# Patient Record
Sex: MALE | Race: WHITE | Employment: OTHER | URBAN - METROPOLITAN AREA
[De-identification: names, ages, dates, MRNs, and addresses within clinical notes are randomized per-mention and may not be internally consistent; named-entity substitution may affect disease eponyms.]

---

## 2019-01-01 ENCOUNTER — DOCUMENTATION ONLY (OUTPATIENT)
Dept: FAMILY MEDICINE | Facility: CLINIC | Age: 24
End: 2019-01-01

## 2019-01-01 ENCOUNTER — OFFICE VISIT (OUTPATIENT)
Dept: FAMILY MEDICINE | Facility: CLINIC | Age: 24
End: 2019-01-01
Payer: COMMERCIAL

## 2019-01-01 ENCOUNTER — TELEPHONE (OUTPATIENT)
Dept: FAMILY MEDICINE | Facility: CLINIC | Age: 24
End: 2019-01-01

## 2019-01-01 ENCOUNTER — TELEPHONE (OUTPATIENT)
Dept: FAMILY MEDICINE | Facility: CLINIC | Age: 24
End: 2019-01-01
Payer: COMMERCIAL

## 2019-01-01 VITALS
TEMPERATURE: 97.9 F | HEART RATE: 98 BPM | OXYGEN SATURATION: 93 % | BODY MASS INDEX: 33.23 KG/M2 | DIASTOLIC BLOOD PRESSURE: 85 MMHG | WEIGHT: 231.6 LBS | RESPIRATION RATE: 16 BRPM | SYSTOLIC BLOOD PRESSURE: 132 MMHG

## 2019-01-01 DIAGNOSIS — L30.9 DERMATITIS: Primary | ICD-10-CM

## 2019-01-01 DIAGNOSIS — F19.90 SUBSTANCE USE DISORDER: ICD-10-CM

## 2019-01-01 LAB
HCV RNA SERPL NAA+PROBE-ACNC: NORMAL [IU]/ML
HCV RNA SERPL NAA+PROBE-LOG IU: NORMAL LOG IU/ML

## 2019-01-01 RX ORDER — BENZOCAINE/MENTHOL 6 MG-10 MG
LOZENGE MUCOUS MEMBRANE 2 TIMES DAILY PRN
Qty: 20 G | Refills: 1 | Status: SHIPPED | OUTPATIENT
Start: 2019-01-01

## 2019-04-29 ENCOUNTER — OFFICE VISIT (OUTPATIENT)
Dept: FAMILY MEDICINE | Facility: CLINIC | Age: 24
End: 2019-04-29
Payer: MEDICAID

## 2019-04-29 VITALS
DIASTOLIC BLOOD PRESSURE: 82 MMHG | HEIGHT: 70 IN | OXYGEN SATURATION: 96 % | HEART RATE: 106 BPM | TEMPERATURE: 98.3 F | SYSTOLIC BLOOD PRESSURE: 119 MMHG | WEIGHT: 220 LBS | BODY MASS INDEX: 31.5 KG/M2 | RESPIRATION RATE: 20 BRPM

## 2019-04-29 DIAGNOSIS — F33.42 MAJOR DEPRESSIVE DISORDER, RECURRENT EPISODE, IN FULL REMISSION (H): ICD-10-CM

## 2019-04-29 DIAGNOSIS — B19.20 HEPATITIS C VIRUS INFECTION WITHOUT HEPATIC COMA, UNSPECIFIED CHRONICITY: ICD-10-CM

## 2019-04-29 LAB
ALBUMIN SERPL-MCNC: 4.3 MG/DL (ref 3.8–5)
ALP SERPL-CCNC: 90.5 U/L (ref 31.7–110.7)
ALT SERPL-CCNC: 23.2 U/L (ref 0–45)
AST SERPL-CCNC: 16.4 U/L (ref 0–55)
BILIRUB SERPL-MCNC: 0.4 MG/DL (ref 0.2–1.3)
BUN SERPL-MCNC: 16.2 MG/DL (ref 7–21)
CALCIUM SERPL-MCNC: 9.3 MG/DL (ref 8.5–10.1)
CHLORIDE SERPLBLD-SCNC: 105.8 MMOL/L (ref 98–110)
CO2 SERPL-SCNC: 22.4 MMOL/L (ref 20–32)
CREAT SERPL-MCNC: 0.9 MG/DL (ref 0.7–1.3)
GFR SERPL CREATININE-BSD FRML MDRD: >90 ML/MIN/1.7 M2
GLUCOSE SERPL-MCNC: 119.8 MG'DL (ref 70–99)
POTASSIUM SERPL-SCNC: 4 MMOL/DL (ref 3.3–4.5)
PROT SERPL-MCNC: 7.1 G/DL (ref 6.8–8.8)
SODIUM SERPL-SCNC: 140.8 MMOL/L (ref 132.6–141.4)

## 2019-04-29 RX ORDER — LANOLIN ALCOHOL/MO/W.PET/CERES
3 CREAM (GRAM) TOPICAL
COMMUNITY

## 2019-04-29 RX ORDER — QUETIAPINE FUMARATE 200 MG/1
200 TABLET, FILM COATED ORAL 2 TIMES DAILY
COMMUNITY
End: 2020-01-01

## 2019-04-29 RX ORDER — HYDROCORTISONE 25 MG/G
OINTMENT TOPICAL 2 TIMES DAILY
COMMUNITY
End: 2019-01-01

## 2019-04-29 RX ORDER — VENLAFAXINE HYDROCHLORIDE 150 MG/1
150 CAPSULE, EXTENDED RELEASE ORAL DAILY
COMMUNITY
End: 2020-01-01

## 2019-04-29 RX ORDER — SIMETHICONE 80 MG
80 TABLET,CHEWABLE ORAL EVERY 6 HOURS PRN
COMMUNITY

## 2019-04-29 ASSESSMENT — MIFFLIN-ST. JEOR: SCORE: 1981.22

## 2019-04-29 NOTE — PROGRESS NOTES
Male Physical Note          HPI         Concerns today: positive hep C screen [facility].     Patient Active Problem List   Diagnosis     Major depressive disorder, recurrent episode, in full remission (H)     Hepatitis C       History reviewed. No pertinent past medical history.    Previous Medical Care   [facility]  [facility]      Family History   Problem Relation Age of Onset     No Known Problems Mother      No Known Problems Father      No Known Problems Sister      No Known Problems Brother               Review of Systems:     Review of Systems:  CONSTITUTIONAL: NEGATIVE for fever, chills, change in weight  INTEGUMENTARY/SKIN: NEGATIVE for worrisome rashes, moles or lesions  EYES: NEGATIVE for vision changes or irritation  ENT/MOUTH: NEGATIVE for ear, mouth and throat problems  RESP: NEGATIVE for significant cough or SOB  BREAST: NEGATIVE for masses, tenderness or discharge  CV: NEGATIVE for chest pain, palpitations or peripheral edema  GI: NEGATIVE for nausea, abdominal pain, heartburn, or change in bowel habits  : NEGATIVE for frequency, dysuria, or hematuria  MUSCULOSKELETAL: NEGATIVE for significant arthralgias or myalgia  NEURO: NEGATIVE for weakness, dizziness or paresthesias  ENDOCRINE: NEGATIVE for temperature intolerance, skin/hair changes  HEME/ALLERGY: NEGATIVE for bleeding problems  PSYCHIATRIC: NEGATIVE for changes in mood or affect  Sleep:   Do you snore most or the night (as reported by a family member)? No  Do you feel sleepy or extremely tired during most of the day? No             Social History     Social History     Socioeconomic History     Marital status: Single     Spouse name: Not on file     Number of children: Not on file     Years of education: Not on file     Highest education level: Not on file   Occupational History     Not on file   Social Needs     Financial resource strain: Not on file     Food insecurity:     Worry: Not on file     Inability: Not on file      "Transportation needs:     Medical: Not on file     Non-medical: Not on file   Tobacco Use     Smoking status: Current Every Day Smoker     Smokeless tobacco: Never Used   Substance and Sexual Activity     Alcohol use: Not Currently     Drug use: Not Currently     Sexual activity: Not Currently   Lifestyle     Physical activity:     Days per week: Not on file     Minutes per session: Not on file     Stress: Not on file   Relationships     Social connections:     Talks on phone: Not on file     Gets together: Not on file     Attends Christianity service: Not on file     Active member of club or organization: Not on file     Attends meetings of clubs or organizations: Not on file     Relationship status: Not on file     Intimate partner violence:     Fear of current or ex partner: Not on file     Emotionally abused: Not on file     Physically abused: Not on file     Forced sexual activity: Not on file   Other Topics Concern     Not on file   Social History Narrative     Not on file       Marital Status:Single  Who lives in your household? Housemates, intermediate house.     Has anyone hurt you physically, for example by pushing, hitting, slapping or kicking you or forcing you to have sex? Denies  Do you feel threatened or controlled by a partner, ex-partner or anyone in your life? Denies    Sexual Health     Sexual concerns: No   STI History: Neg      Recommended Screening   None          Physical Exam:     Vitals: /82   Pulse 106   Temp 98.3  F (36.8  C) (Oral)   Resp 20   Ht 1.765 m (5' 9.5\")   Wt 99.8 kg (220 lb)   SpO2 96%   BMI 32.02 kg/m    BMI= Body mass index is 32.02 kg/m .  GENERAL: healthy, alert and no distress  EYES: Eyes grossly normal to inspection, extraocular movements - intact, and PERRL  HENT: ear canals- normal; TMs- normal; Nose- normal; Mouth- no ulcers, no lesions  NECK: no tenderness, no adenopathy, no asymmetry, no masses, no stiffness; thyroid- normal to palpation  RESP: lungs clear to " auscultation - no rales, no rhonchi, no wheezes  BREAST: no masses, no tenderness, no nipple discharge, no palpable axillary masses or adenopathy  CV: regular rates and rhythm, normal S1 S2, no S3 or S4 and no murmur, no click or rub -  ABDOMEN: soft, no tenderness, no  hepatosplenomegaly, no masses, normal bowel sounds  MS: extremities- no gross deformities noted, no edema  SKIN: no suspicious lesions, no rashes  NEURO: strength and tone- normal, sensory exam- grossly normal, mentation- intact, speech- normal, reflexes- symmetric  BACK: no CVA tenderness, no paralumbar tenderness  PSYCH: Alert and oriented times 3; speech- coherent , normal rate and volume; able to articulate logical thoughts, able to abstract reason, no tangential thoughts, no hallucinations or delusions, affect- normal  LYMPHATICS: ant. cervical- normal, post. cervical- normal, axillary- normal, supraclavicular- normal, inguinal- normal    Assessment and Plan      Kamron was seen today for physical.    Diagnoses and all orders for this visit:    Major depressive disorder, recurrent episode, in full remission (H)    Hepatitis C virus infection without hepatic coma, unspecified chronicity      1. Health Care Maintenance: Normal Physical Exam    PLAN:  1. Check hep C screen w/ reflex to quant and genotype   2. Referal to hepatology if positive hep C   3. CMP   4. Discontinue gas-x     Options for treatment and follow-up care were reviewed with the patient. Kamron Duarte and/or guardian engaged in the decision making process and verbalized understanding of the options discussed and agreed with the final plan.    Michael Johnson MD

## 2019-04-29 NOTE — PROGRESS NOTES
Preceptor Attestation:   Patient seen, evaluated and discussed with the resident. I have verified the content of the note, which accurately reflects my assessment of the patient and the plan of care.   Supervising Physician:  Kyra Garcia MD

## 2019-04-29 NOTE — PATIENT INSTRUCTIONS
Here is the plan from today's visit    1. Major depressive disorder, recurrent episode, in full remission (H)    2. Hepatitis C virus infection without hepatic coma, unspecified chronicity  We will call with results.   - Hepatitis C Screen Reflex to HCV RNA Quant and Genotype  - Comprehensive Metabolic Panel ([facility])    Please call or return to clinic if your symptoms don't go away.    Follow up plan  Please make a clinic appointment for follow up with me (JASON HERNANDEZ) in 1  Year or sooner if needed.      Thank you for coming to [facility] today.  Lab Testing:  **If you had lab testing today and your results are reassuring or normal they will be mailed to you or sent through [employer] within 7 days.   **If the lab tests need quick action we will call you with the results.  The phone number we will call with results is # [phone] (home) . If this is not the best number please call our clinic and change the number.  Medication Refills:  If you need any refills please call your pharmacy and they will contact us.   If you need to  your refill at a new pharmacy, please contact the new pharmacy directly. The new pharmacy will help you get your medications transferred faster.   Scheduling:  If you have any concerns about today's visit or wish to schedule another appointment please call our office during normal business hours [phone] (8-5:00 M-F)  If a referral was made to a [facility] and you don't get a call from central scheduling please call [phone].  If a Mammogram was ordered for you at [facility] call [phone] to schedule or change your appointment.  If you had an XRay/CT/Ultrasound/MRI ordered the number is [phone] to schedule or change your radiology appointment.   Medical Concerns:  If you have urgent medical concerns please call [phone] at any time of the day.    Jason Hernandez MD      Preventive Health Recommendations  Male Ages 21 -  25     Yearly exam:             See your health care provider every year in order to  o   Review health changes.   o   Discuss preventive care.    o   Review your medicines if your doctor has prescribed any.    You should be tested each year for STDs (sexually transmitted diseases).     Talk to your provider about cholesterol testing.      If you are at risk for diabetes, you should have a diabetes test (fasting glucose).    Shots: Get a flu shot each year. Get a tetanus shot every 10 years.     Nutrition:    Eat at least 5 servings of fruits and vegetables daily.     Eat whole-grain bread, whole-wheat pasta and brown rice instead of white grains and rice.     Get adequate calcium and Vitamin D.     Lifestyle    Exercise for at least 150 minutes a week (30 minutes a day, 5 days a week). This will help you control your weight and prevent disease.     Limit alcohol to one drink per day.     No smoking.     Wear sunscreen to prevent skin cancer.     See your dentist every six months for an exam and cleaning.

## 2019-05-02 LAB
HCV AB SERPL QL IA: REACTIVE
HCV RNA SERPL NAA+PROBE-ACNC: NORMAL [IU]/ML
HCV RNA SERPL NAA+PROBE-LOG IU: NORMAL LOG IU/ML

## 2019-05-06 ENCOUNTER — TELEPHONE (OUTPATIENT)
Dept: FAMILY MEDICINE | Facility: CLINIC | Age: 24
End: 2019-05-06

## 2019-05-06 DIAGNOSIS — B19.20 HEPATITIS C VIRUS INFECTION WITHOUT HEPATIC COMA, UNSPECIFIED CHRONICITY: Primary | ICD-10-CM

## 2019-05-06 NOTE — TELEPHONE ENCOUNTER
"Per PCP, \"Please call the patent with the following message: Your hepatitis C screen was positive but there was no sign of active disease. You should still go see GI to discuss these results but likely will not need any treatment. The referral has been placed for you. \"    RN attempted to reach patient/ group home, but was unable to reach. Left voicemail with name and callback number.  Radha Choudhury RN    "

## 2019-05-07 ENCOUNTER — DOCUMENTATION ONLY (OUTPATIENT)
Dept: CARE COORDINATION | Facility: CLINIC | Age: 24
End: 2019-05-07

## 2019-05-07 ENCOUNTER — TELEPHONE (OUTPATIENT)
Dept: FAMILY MEDICINE | Facility: CLINIC | Age: 24
End: 2019-05-07

## 2019-05-07 NOTE — TELEPHONE ENCOUNTER
"Called patient to relay results message per PCP, informed patient Hepatitis C screening was to positive, but \"no active disease\" and to schedule GI appointment. Telephone number to schedule appointment given.     Andreia Jauregui RN    "

## 2019-05-07 NOTE — TELEPHONE ENCOUNTER
"Received return phone call from \"housing \" lived by the patient.     RN informed Program , the need to have SOLO, prior to discussing health information.    Andreia Jauregui RN    "

## 2019-05-08 NOTE — PROGRESS NOTES
GI Referral   Hi, pt is scheduled 5/16 in hepatology      Nael Felton [facility]  Care Coordinator

## 2019-05-16 ENCOUNTER — OFFICE VISIT (OUTPATIENT)
Dept: GASTROENTEROLOGY | Facility: CLINIC | Age: 24
End: 2019-05-16
Attending: INTERNAL MEDICINE
Payer: MEDICAID

## 2019-05-16 VITALS
HEIGHT: 69 IN | DIASTOLIC BLOOD PRESSURE: 81 MMHG | BODY MASS INDEX: 32.88 KG/M2 | SYSTOLIC BLOOD PRESSURE: 126 MMHG | WEIGHT: 222 LBS | TEMPERATURE: 98.2 F | HEART RATE: 112 BPM

## 2019-05-16 DIAGNOSIS — R76.8 HEPATITIS C ANTIBODY POSITIVE IN BLOOD: ICD-10-CM

## 2019-05-16 DIAGNOSIS — R76.8 HEPATITIS C ANTIBODY POSITIVE IN BLOOD: Primary | ICD-10-CM

## 2019-05-16 LAB
HAV IGG SER QL IA: REACTIVE
HBV CORE AB SERPL QL IA: NONREACTIVE
HBV SURFACE AB SERPL IA-ACNC: >1000 M[IU]/ML
HBV SURFACE AG SERPL QL IA: NONREACTIVE
HCV AB SERPL QL IA: REACTIVE

## 2019-05-16 PROCEDURE — 87522 HEPATITIS C REVRS TRNSCRPJ: CPT | Performed by: INTERNAL MEDICINE

## 2019-05-16 PROCEDURE — G0499 HEPB SCREEN HIGH RISK INDIV: HCPCS | Performed by: INTERNAL MEDICINE

## 2019-05-16 PROCEDURE — 36415 COLL VENOUS BLD VENIPUNCTURE: CPT | Performed by: INTERNAL MEDICINE

## 2019-05-16 PROCEDURE — 86803 HEPATITIS C AB TEST: CPT | Performed by: INTERNAL MEDICINE

## 2019-05-16 PROCEDURE — 86706 HEP B SURFACE ANTIBODY: CPT | Performed by: INTERNAL MEDICINE

## 2019-05-16 PROCEDURE — 86708 HEPATITIS A ANTIBODY: CPT | Performed by: INTERNAL MEDICINE

## 2019-05-16 PROCEDURE — 86704 HEP B CORE ANTIBODY TOTAL: CPT | Performed by: INTERNAL MEDICINE

## 2019-05-16 PROCEDURE — G0463 HOSPITAL OUTPT CLINIC VISIT: HCPCS | Mod: ZF

## 2019-05-16 RX ORDER — HYDROXYZINE HYDROCHLORIDE 50 MG/1
TABLET, FILM COATED ORAL
COMMUNITY
Start: 2019-05-09 | End: 2020-01-01

## 2019-05-16 ASSESSMENT — PAIN SCALES - GENERAL: PAINLEVEL: NO PAIN (0)

## 2019-05-16 ASSESSMENT — MIFFLIN-ST. JEOR: SCORE: 1982.37

## 2019-05-16 NOTE — NURSING NOTE
"/81   Pulse 112   Temp 98.2  F (36.8  C) (Oral)   Ht 1.753 m (5' 9\")   Wt 100.7 kg (222 lb)   BMI 32.78 kg/m    Chief Complaint   Patient presents with     Consult     consult with Jethro ASHLEY, oumou milan       "

## 2019-05-16 NOTE — LETTER
5/16/2019       RE: Kamron Duarte  [address] [address]     Dear Colleague,    Thank you for referring your patient, Kamron Duarte, to the [facility] HEPATOLOG[facility] at [facility]. Please see a copy of my visit note below.    Date of Service: 5/16/2019     Referring Provider: Michael Johnson    Subjective:            Kamron Duarte is a 23 year old male presenting for evaluation of abnormal hepatitis tests    History of Present Illness   Kamron Duarte is a 23 year old male with past medical history of IV narcotic use who presents in consultation for recent screening for infections and was found to have hepatitis C antibody positive.    The patient admits that he had used IV narcotics for many years, and has been sober since August 2018.  He has not required or is currently using any transition or bridging oral therapies.  He denies significant history of alcohol use and denies alcohol misuse.  He notes very rare inhaled drugs of abuse.  He denies use of marijuana or any cannabinoids substances at this time.  He reports that [facility] infectious diseases and reports that he was negative for HIV but did test positive for hepatitis C.  He reports that he used IV drugs after this clinic visit up until August and has been sober since this time.  Has established care with a primary care provider and underwent lab tests on April 29, 2019.  These demonstrated: AST 16, ALT 23, alkaline phosphatase 90, total bilirubin 0.4, hepatitis C antibody positive, hepatitis C RNA not detected.  Based on the studies the patient was referred for further assessment.    He admits to historically being sexually active with women, denies any overt high risk sexual encounters.  He does admit to sharing needles with other IV drug users in the past.  He does have 2 tattoos, neither of which was performed professionally.  He notes  "that a maternal grandfather had an unspecified hepatitis and liver disease which ultimately led to his death.  There is no overt family history of liver cancer.    Past Medical History:  -Major depressive disorder  -History of IV narcotic misuse    Surgical History:  Past Surgical History:   Procedure Laterality Date     TONSILLECTOMY         Social History:  Social History     Tobacco Use     Smoking status: Current Every Day Smoker     Smokeless tobacco: Never Used   Substance Use Topics     Alcohol use: Not Currently     Drug use: Not Currently        Family History:  -Maternal grandmother: Breast cancer  -Maternal grandfather: Hepatitis    Medications:  Current Outpatient Medications   Medication     hydrocortisone 2.5 % ointment     hydrOXYzine (ATARAX) 50 MG tablet     QUEtiapine (SEROQUEL) 200 MG tablet     venlafaxine (EFFEXOR-XR) 150 MG 24 hr capsule     melatonin 3 MG tablet     simethicone (MYLICON) 80 MG chewable tablet     No current facility-administered medications for this visit.        Review of Systems  A complete 10 point review of systems was asked and answered in the negative unless specifically commented upon in the HPI    Objective:         Vitals:    05/16/19 1047   BP: 126/81   Pulse: 112   Temp: 98.2  F (36.8  C)   TempSrc: Oral   Weight: 100.7 kg (222 lb)   Height: 1.753 m (5' 9\")     Body mass index is 32.78 kg/m .     Physical Exam    Vitals reviewed.   Constitutional: Well-developed, well-nourished, in no apparent distress.    HEENT: Normocephalic. no scleral icterus. Moist oral mucosa. Dentition normal  Neck/Lymph: Normal ROM, supple. No thyromegaly.  No lymphadenopathy  Cardiac:  Regular rate and rhythm.  No overt murmurs  Respiratory: Clear to auscultation bilaterally.  No wheezes or rales  GI:  Abdomen soft, non-distended, non-tender. BS present. no shifting dullness. No overt hepatosplenomegaly.     Skin:  Skin is warm and dry. No rash noted.  no jaundice. no spider nevi noted.  no " palmar erythema  Peripheral Vascular: no lower extremity edema. 2+ pulses in all extremities  Musculoskeletal:  ROM intact, normal muscle bulk    Psychiatric: Normal mood and affect. Behavior is normal.  Neuro:  no asterixis, no tremor    Labs and Diagnostic tests:  Lab Results   Component Value Date    BILITOTAL 0.4 04/29/2019    ALT 23.2 04/29/2019    AST 16.4 04/29/2019    ALKPHOS 90.5 04/29/2019     Lab Results   Component Value Date    PROTTOTAL 7.1 04/29/2019      Results for SEN LEIGH (MRN [id number]) as of 5/16/2019 12:24   Ref. Range 4/29/2019 15:06   Hepatitis C Antibody Latest Ref Range: NR^Nonreactive  Reactive (AA)   HCV RNA Quant IU/ml Latest Ref Range: HCVND^HCV RNA Not Detected [IU]/mL HCV RNA Not Detected   Log of HCV RNA Qt Latest Ref Range: <1.2 Log IU/mL Not Calculated       Assessment and Plan:    Abnormal Hepatitis Serologies:    -Based on the available data, the patient has a history of hepatitis C antibody positive and hepatitis C RNA negative serologies, as recently as April 29, 2019  -If these values are true, the patient has developed a spontaneous clearance of a previous infection from chronic hepatitis C and has developed cure and immunity  -Because of his previous high risk, I do feel it is prudent to confirm these results with a repeat hepatitis C antibody and RNA quantification  -Also felt prudent to today to evaluate for immunity or exposure to hepatitis A and B  -As he had recent normal liver tests, I do not believe necessary to otherwise repeat these  -If his labs returned without evidence of active infection he will be discharged from our clinic without risk of long-term sequela    Follow Up:  As needed     Thank you very much for the opportunity to participate in the care of this patient.  If you have any further questions, please don't hesitate to contact our office.    Thomas M. Leventhal, M.D.   of Medicine  Advanced & Transplant Hepatology  The  [facility]    I spent a total time (reviewing notes, labs, imaging, clinical status events) of 30 minutes with the patient, of which > 50% was spent face-to-face with the patient in education, care coordination and counseling regarding the patient's liver tests (explaining treatment options, disease processes, laboratory/imaging results, prognosis, risks and benefits of treatment options, medication side effects, importance of compliance with treatment, risk factor reduction, follow up with primary care physician).        Again, thank you for allowing me to participate in the care of your patient.      Sincerely,    Thomas M. Leventhal, MD

## 2019-05-16 NOTE — PROGRESS NOTES
Date of Service: 5/16/2019     Referring Provider: Michael Johnson    Subjective:            Kamron Duarte is a 23 year old male presenting for evaluation of abnormal hepatitis tests    History of Present Illness   Kamron Duarte is a 23 year old male with past medical history of IV narcotic use who presents in consultation for recent screening for infections and was found to have hepatitis C antibody positive.    The patient admits that he had used IV narcotics for many years, and has been sober since August 2018.  He has not required or is currently using any transition or bridging oral therapies.  He denies significant history of alcohol use and denies alcohol misuse.  He notes very rare inhaled drugs of abuse.  He denies use of marijuana or any cannabinoids substances at this time.  He reports that [facility] diseases and reports that he was negative for HIV but did test positive for hepatitis C.  He reports that he used IV drugs after this clinic visit up until August and has been sober since this time.  Has established care with a primary care provider and underwent lab tests on April 29, 2019.  These demonstrated: AST 16, ALT 23, alkaline phosphatase 90, total bilirubin 0.4, hepatitis C antibody positive, hepatitis C RNA not detected.  Based on the studies the patient was referred for further assessment.    He admits to historically being sexually active with women, denies any overt high risk sexual encounters.  He does admit to sharing needles with other IV drug users in the past.  He does have 2 tattoos, neither of which was performed professionally.  He notes that a maternal grandfather had an unspecified hepatitis and liver disease which ultimately led to his death.  There is no overt family history of liver cancer.    Past Medical History:  -Major depressive disorder  -History of IV narcotic misuse    Surgical History:  Past  "Surgical History:   Procedure Laterality Date     TONSILLECTOMY         Social History:  Social History     Tobacco Use     Smoking status: Current Every Day Smoker     Smokeless tobacco: Never Used   Substance Use Topics     Alcohol use: Not Currently     Drug use: Not Currently        Family History:  -Maternal grandmother: Breast cancer  -Maternal grandfather: Hepatitis    Medications:  Current Outpatient Medications   Medication     hydrocortisone 2.5 % ointment     hydrOXYzine (ATARAX) 50 MG tablet     QUEtiapine (SEROQUEL) 200 MG tablet     venlafaxine (EFFEXOR-XR) 150 MG 24 hr capsule     melatonin 3 MG tablet     simethicone (MYLICON) 80 MG chewable tablet     No current facility-administered medications for this visit.        Review of Systems  A complete 10 point review of systems was asked and answered in the negative unless specifically commented upon in the HPI    Objective:         Vitals:    05/16/19 1047   BP: 126/81   Pulse: 112   Temp: 98.2  F (36.8  C)   TempSrc: Oral   Weight: 100.7 kg (222 lb)   Height: 1.753 m (5' 9\")     Body mass index is 32.78 kg/m .     Physical Exam    Vitals reviewed.   Constitutional: Well-developed, well-nourished, in no apparent distress.    HEENT: Normocephalic. no scleral icterus. Moist oral mucosa. Dentition normal  Neck/Lymph: Normal ROM, supple. No thyromegaly.  No lymphadenopathy  Cardiac:  Regular rate and rhythm.  No overt murmurs  Respiratory: Clear to auscultation bilaterally.  No wheezes or rales  GI:  Abdomen soft, non-distended, non-tender. BS present. no shifting dullness. No overt hepatosplenomegaly.     Skin:  Skin is warm and dry. No rash noted.  no jaundice. no spider nevi noted.  no palmar erythema  Peripheral Vascular: no lower extremity edema. 2+ pulses in all extremities  Musculoskeletal:  ROM intact, normal muscle bulk    Psychiatric: Normal mood and affect. Behavior is normal.  Neuro:  no asterixis, no tremor    Labs and Diagnostic tests:  Lab " Results   Component Value Date    BILITOTAL 0.4 04/29/2019    ALT 23.2 04/29/2019    AST 16.4 04/29/2019    ALKPHOS 90.5 04/29/2019     Lab Results   Component Value Date    PROTTOTAL 7.1 04/29/2019      Results for SEN LEIGH (MRN [id number]) as of 5/16/2019 12:24   Ref. Range 4/29/2019 15:06   Hepatitis C Antibody Latest Ref Range: NR^Nonreactive  Reactive (AA)   HCV RNA Quant IU/ml Latest Ref Range: HCVND^HCV RNA Not Detected [IU]/mL HCV RNA Not Detected   Log of HCV RNA Qt Latest Ref Range: <1.2 Log IU/mL Not Calculated       Assessment and Plan:    Abnormal Hepatitis Serologies:    -Based on the available data, the patient has a history of hepatitis C antibody positive and hepatitis C RNA negative serologies, as recently as April 29, 2019  -If these values are true, the patient has developed a spontaneous clearance of a previous infection from chronic hepatitis C and has developed cure and immunity  -Because of his previous high risk, I do feel it is prudent to confirm these results with a repeat hepatitis C antibody and RNA quantification  -Also felt prudent to today to evaluate for immunity or exposure to hepatitis A and B  -As he had recent normal liver tests, I do not believe necessary to otherwise repeat these  -If his labs returned without evidence of active infection he will be discharged from our clinic without risk of long-term sequela    Follow Up:  As needed     Thank you very much for the opportunity to participate in the care of this patient.  If you have any further questions, please don't hesitate to contact our office.    Thomas M. Leventhal, M.D.   of Medicine  Advanced & Transplant Hepatology  [facility]    I spent a total time (reviewing notes, labs, imaging, clinical status events) of 30 minutes with the patient, of which > 50% was spent face-to-face with the patient in education, care coordination and counseling regarding the patient's  liver tests (explaining treatment options, disease processes, laboratory/imaging results, prognosis, risks and benefits of treatment options, medication side effects, importance of compliance with treatment, risk factor reduction, follow up with primary care physician).

## 2019-05-17 ENCOUNTER — OFFICE VISIT (OUTPATIENT)
Dept: FAMILY MEDICINE | Facility: CLINIC | Age: 24
End: 2019-05-17
Payer: MEDICAID

## 2019-05-17 VITALS
TEMPERATURE: 98.2 F | SYSTOLIC BLOOD PRESSURE: 122 MMHG | HEART RATE: 74 BPM | RESPIRATION RATE: 16 BRPM | OXYGEN SATURATION: 97 % | DIASTOLIC BLOOD PRESSURE: 84 MMHG | WEIGHT: 224 LBS | BODY MASS INDEX: 32.07 KG/M2 | HEIGHT: 70 IN

## 2019-05-17 DIAGNOSIS — R11.2 NON-INTRACTABLE VOMITING WITH NAUSEA, UNSPECIFIED VOMITING TYPE: Primary | ICD-10-CM

## 2019-05-17 ASSESSMENT — MIFFLIN-ST. JEOR: SCORE: 2007.31

## 2019-05-24 NOTE — PROGRESS NOTES
ASSESSMENT/PLAN:         ICD-10-CM    1. Non-intractable vomiting with nausea, unspecified vomiting type R11.2              SUBJECTIVE:   Kamron Duarte is a 23 year old male who presents to clinic today for the following health issues:  1.  Episode of vomiting--patient states that he had an episode of vomiting.  Says he may have eaten something different.  The episode happened a couple of days ago and then has had not episodes since.  Had nausea with it but it has since resolved.  No diarrhea.  No significant abdominal pain.  No fevers.  Has a history of depression and hep c but has not noticed any skin color changes and, again, resolved after just one day of feeling sick.      Problem list and histories reviewed & adjusted, as indicated.  Additional history: as documented    Patient Active Problem List   Diagnosis     Major depressive disorder, recurrent episode, in full remission (H)     Hepatitis C     Past Surgical History:   Procedure Laterality Date     TONSILLECTOMY         Social History     Tobacco Use     Smoking status: Current Every Day Smoker     Smokeless tobacco: Never Used   Substance Use Topics     Alcohol use: Not Currently     Family History   Problem Relation Age of Onset     No Known Problems Mother      No Known Problems Father      No Known Problems Sister      No Known Problems Brother          Current Outpatient Medications   Medication Sig Dispense Refill     hydrocortisone 2.5 % ointment Apply topically 2 times daily       hydrOXYzine (ATARAX) 50 MG tablet        QUEtiapine (SEROQUEL) 200 MG tablet Take 200 mg by mouth 2 times daily       venlafaxine (EFFEXOR-XR) 150 MG 24 hr capsule Take 150 mg by mouth daily       melatonin 3 MG tablet Take 3 mg by mouth nightly as needed for sleep       simethicone (MYLICON) 80 MG chewable tablet Take 80 mg by mouth every 6 hours as needed for flatulence or cramping       Allergies   Allergen Reactions     Mushrooms [Mushroom]      Sulfa Drugs   "      Reviewed and updated as needed this visit by clinical staff  Tobacco  Allergies  Meds  Med Hx  Surg Hx  Fam Hx  Soc Hx      Reviewed and updated as needed this visit by Provider         ROS:  Constitutional, HEENT, cardiovascular, pulmonary, gi and gu systems are negative, except as otherwise noted.    OBJECTIVE:     /84   Pulse 74   Temp 98.2  F (36.8  C) (Oral)   Resp 16   Ht 1.778 m (5' 10\")   Wt 101.6 kg (224 lb)   SpO2 97%   BMI 32.14 kg/m    Body mass index is 32.14 kg/m .  GENERAL: healthy, alert and no distress  RESP: lungs clear to auscultation - no rales, rhonchi or wheezes  CV: regular rate and rhythm, normal S1 S2, no S3 or S4, no murmur, click or rub, no peripheral edema and peripheral pulses strong  ABDOMEN: soft, nontender, no hepatosplenomegaly, no masses and bowel sounds normal  MS: no gross musculoskeletal defects noted, no edema    Diagnostic Test Results:  none         Ar Garcia MD  [facility]    "

## 2019-05-30 NOTE — TELEPHONE ENCOUNTER
Kota, manager at [facility], calling to request that hydrocortisone ointment be changed from being a scheduled medication to a PRN medication.

## 2019-05-30 NOTE — TELEPHONE ENCOUNTER
Called patient/ to notify Pt needs to be seen to address medication needs and instruction of how to use. Telephone call transferred to  call center to assist patient to schedule an appointment with PCP.    Andreia Jauregui RN

## 2019-05-30 NOTE — TELEPHONE ENCOUNTER
[facility] phone call message- medication clarification/question:    Full Medication Name: hydrocortisone 2.5 % ointment   Dose: Apply topically 2 times daily - Topical    Question/Clarification needed: Patient requesting to have medication changed from being a scheduled medication to a PRN medication. Please call patient once update script is sent to pharmacy. Patient was scheduled to be seen today to discuss medication change but arrived late and declined to reschedule. Please advise.     Pharmacy confirmed as   [facility] - [place], [place] - [address]  [address]  [address]  [facility] [address]  Phone: [phone] Fax: [phone]  : Yes    Please leave ONLY preferred pharmacy    OK to leave a message on voice mail? Yes    Advised patient that RN would call back within 3 hours, unless emergent.    Primary language: English      needed? No    Call taken on May 30, 2019 at 10:33 AM by Halima Mckeon to [facility]

## 2019-06-04 PROBLEM — F19.90 SUBSTANCE USE DISORDER: Status: ACTIVE | Noted: 2019-01-01

## 2019-06-04 NOTE — PROGRESS NOTES
Preceptor Attestation:   Patient seen, evaluated and discussed with the resident. I have verified the content of the note, which accurately reflects my assessment of the patient and the plan of care.   Supervising Physician:  Angela Garcia MD

## 2019-06-04 NOTE — PATIENT INSTRUCTIONS
Here is the plan from today's visit    1. Dermatitis  - hydrocortisone (CORTAID) 1 % external cream; Apply topically 2 times daily as needed for rash or itching  Dispense: 20 g; Refill: 1    Please call or return to clinic if your symptoms don't go away.    Follow up plan  As needed, in next week if desiring suboxone through our clinic.     Thank you for coming to [facility] today.  Lab Testing:  **If you had lab testing today and your results are reassuring or normal they will be mailed to you or sent through [facility] within 7 days.   **If the lab tests need quick action we will call you with the results.  The phone number we will call with results is # [phone] (home) . If this is not the best number please call our clinic and change the number.  Medication Refills:  If you need any refills please call your pharmacy and they will contact us.   If you need to  your refill at a new pharmacy, please contact the new pharmacy directly. The new pharmacy will help you get your medications transferred faster.   Scheduling:  If you have any concerns about today's visit or wish to schedule another appointment please call our office during normal business hours [phone] (8-5:00 M-F)  If a referral was made to a [facility] and you don't get a call from central scheduling please call [phone].  If a Mammogram was ordered for you at [facility] call [phone] to schedule or change your appointment.  If you had an XRay/CT/Ultrasound/MRI ordered the number is [phone] to schedule or change your radiology appointment.   Medical Concerns:  If you have urgent medical concerns please call [phone] at any time of the day.    Michael Johnson MD

## 2019-06-04 NOTE — PROGRESS NOTES
HPI       Kamron Duarte is a 23 year old  who presents for     Chief Complaint   Patient presents with     Recheck Medication     hydrocortisone      Kamron Duarte is a 23 year old male who presents for medication adjustment of his hydrocortisone from scheduled to PRN. It was prescribed for a rash and pruritis on his head and scalp while hospitalized at [place]. His rash and pruritis are now resolved. Unclear diagnosis.     He has a history of major depressive disorder, resolved hepatitis C, and IV drug use. He was recently admitted to an outside ED for a heroin overdose, and during his visit he experienced a temporary raise in creatine and elevated troponin secondary to hypotension and hypoxia. He left [place] with a suboxone prescription and follow up at a addiction clinic on 6/10/2019. The patient returned to his group home. Currently denies any withdrawal symptoms while on suboxone and is only complaining of some fatigue.     For his mental health he continues to follow with psychiatry.         +++++++      Problem, Medication and Allergy Lists were      Patient Active Problem List    Diagnosis Date Noted     Major depressive disorder, recurrent episode, in full remission (H) 04/29/2019     Priority: Medium     Hepatitis C 04/29/2019     Priority: Medium         Current Outpatient Medications   Medication Sig Dispense Refill     hydrocortisone (CORTAID) 1 % external cream Apply topically 2 times daily as needed for rash or itching 20 g 1     hydrOXYzine (ATARAX) 50 MG tablet        QUEtiapine (SEROQUEL) 200 MG tablet Take 200 mg by mouth 2 times daily       venlafaxine (EFFEXOR-XR) 150 MG 24 hr capsule Take 150 mg by mouth daily       melatonin 3 MG tablet Take 3 mg by mouth nightly as needed for sleep       simethicone (MYLICON) 80 MG chewable tablet Take 80 mg by mouth every 6 hours as needed for flatulence or cramping           Allergies   Allergen Reactions     Mushrooms [Mushroom]      Sulfa Drugs   "  .    Patient is an established patient of this clinic..         Review of Systems:   Review of Systems  A greater than 4 point review of symptoms was completed and negative other than noted in the HPI.        Physical Exam:     Vitals:    06/04/19 1428   BP: 132/85   Pulse: 98   Resp: 16   Temp: 97.9  F (36.6  C)   TempSrc: Oral   SpO2: 93%   Weight: 105.1 kg (231 lb 9.6 oz)     Body mass index is 33.23 kg/m .  Vitals were reviewed and were normal    Physical Exam   Constitutional: He appears well-developed and well-nourished. No distress.   Cardiovascular: Normal rate, regular rhythm and normal heart sounds.   Pulmonary/Chest: Effort normal and breath sounds normal. No stridor. No respiratory distress. He has no wheezes. He has no rales.   Neurological: He is alert.   Skin: Skin is warm and dry. He is not diaphoretic.   Some small erythematous lesions on right post auricular area.    Psychiatric:   Appears depressed            Results:   No testing ordered today    Assessment and Plan      Dermatitis/Hydrocortisone Adjustment   Provided new prescription for the 1% hydrocortisone PRN. Completed paperwork for group home regarding hydrocortisone use.     Opoid Addiction/Suboxone   Counseled patient on the option of continuing his opioid addiction and suboxone treatment here with his primary care through our suboxone clinic. Patient's group  said that this would be shared with their management and discussed. They will contact if desired. Patient plans to continue on suboxone \"for a while\". Would also do repeat screening for HIV, hepatitis on next follow up.       Kamron was seen today for recheck medication.    Diagnoses and all orders for this visit:    Dermatitis  -     hydrocortisone (CORTAID) 1 % external cream; Apply topically 2 times daily as needed for rash or itching           Medications Discontinued During This Encounter   Medication Reason     hydrocortisone 2.5 % ointment        Options for " treatment and follow-up care were reviewed with the patient. Kamrno Duarte  engaged in the decision making process and verbalized understanding of the options discussed and agreed with the final plan.    Michael Johnson MD

## 2019-06-05 NOTE — TELEPHONE ENCOUNTER
[facility] phone call message- general phone call:    Reason for call: Kota,  with [facility], calling to speak with Care Coordinator in charge of Suboxone Program. Per Kota, facility would like to start patient on Suboxone but have a few questions and concerns they would like to discuss.    Action Desired: Call back     Return call needed: Yes    OK to leave a message on voice mail? Yes    Advised patient response may take up to 2 business days: Yes    Primary language: English      needed? No    Call taken on June 5, 2019 at 11:42 AM by Halima Walker    [facility] to [facility] TRIAGE

## 2019-06-06 NOTE — TELEPHONE ENCOUNTER
"6/6/19 I was able to speak to Kota this afternoon. He wanted to know a little more about the Suboxone Program here at [facility]. I gave him the information he requested and he stated that he \"would speak to his supervisor and call me back for scheduling.    Sharon Fatima  Care Coordinator    "

## 2019-06-07 NOTE — PROGRESS NOTES
"When opening a documentation only encounter, be sure to enter in \"Chief Complaint\" Forms and in \" Comments\" Title of form, description if needed.    Kamron is a 23 year old  male  Form received via: Fax  Form now resides in: Provider Ready    Kitty Navarrete CMA     Form has been completed by provider.     Form sent out via: Fax to Community involvement program at Fax Number: [phone]  Patient informed: na  Output date: June 7, 2019    Kitty Navarrete CMA      **Please close the encounter**                      "

## 2019-09-12 NOTE — TELEPHONE ENCOUNTER
"Patient has no showed 2 scheduled appointments. Please use following script to explain no show policy to patient:    \"We see that you have missed some of your recently scheduled appointments. At [facility] we have a new no show policy, where if you miss too many appointments within 1 year, we may not be able to continue to schedule you. If you are unable to make your scheduled appointments, please call the clinic to cancel prior to your appointment time.\"    "

## 2020-01-01 ENCOUNTER — OFFICE VISIT (OUTPATIENT)
Dept: PSYCHOLOGY | Facility: CLINIC | Age: 25
End: 2020-01-01
Payer: MEDICAID

## 2020-01-01 ENCOUNTER — TRANSFERRED RECORDS (OUTPATIENT)
Dept: HEALTH INFORMATION MANAGEMENT | Facility: CLINIC | Age: 25
End: 2020-01-01

## 2020-01-01 ENCOUNTER — TELEPHONE (OUTPATIENT)
Dept: FAMILY MEDICINE | Facility: CLINIC | Age: 25
End: 2020-01-01

## 2020-01-01 ENCOUNTER — VIRTUAL VISIT (OUTPATIENT)
Dept: PSYCHIATRY | Facility: CLINIC | Age: 25
End: 2020-01-01
Attending: PSYCHOLOGIST
Payer: MEDICAID

## 2020-01-01 ENCOUNTER — TELEPHONE (OUTPATIENT)
Dept: PSYCHOLOGY | Facility: CLINIC | Age: 25
End: 2020-01-01

## 2020-01-01 ENCOUNTER — VIRTUAL VISIT (OUTPATIENT)
Dept: FAMILY MEDICINE | Facility: CLINIC | Age: 25
End: 2020-01-01
Payer: MEDICAID

## 2020-01-01 ENCOUNTER — DOCUMENTATION ONLY (OUTPATIENT)
Dept: PSYCHOLOGY | Facility: CLINIC | Age: 25
End: 2020-01-01

## 2020-01-01 ENCOUNTER — TELEPHONE (OUTPATIENT)
Dept: PSYCHIATRY | Facility: CLINIC | Age: 25
End: 2020-01-01

## 2020-01-01 DIAGNOSIS — F20.2: Primary | ICD-10-CM

## 2020-01-01 DIAGNOSIS — F20.9 SCHIZOPHRENIA, UNSPECIFIED TYPE (H): ICD-10-CM

## 2020-01-01 DIAGNOSIS — F20.2 CATATONIC SCHIZOPHRENIA (H): Primary | ICD-10-CM

## 2020-01-01 DIAGNOSIS — F06.1 SEVERE RECURRENT MAJOR DEPRESSIVE DISORDER WITH PSYCHOTIC FEATURES WITH CATATONIA (H): Primary | ICD-10-CM

## 2020-01-01 DIAGNOSIS — F19.90 SUBSTANCE USE DISORDER: ICD-10-CM

## 2020-01-01 DIAGNOSIS — F43.10 PTSD (POST-TRAUMATIC STRESS DISORDER): ICD-10-CM

## 2020-01-01 DIAGNOSIS — F33.3 SEVERE RECURRENT MAJOR DEPRESSIVE DISORDER WITH PSYCHOTIC FEATURES WITH CATATONIA (H): Primary | ICD-10-CM

## 2020-01-01 RX ORDER — ARIPIPRAZOLE 5 MG/1
5 TABLET ORAL DAILY
Qty: 30 TABLET | Refills: 1 | Status: SHIPPED | OUTPATIENT
Start: 2020-01-01

## 2020-01-01 RX ORDER — QUETIAPINE FUMARATE 200 MG/1
400 TABLET, FILM COATED ORAL AT BEDTIME
Qty: 60 TABLET | Refills: 3 | Status: SHIPPED | OUTPATIENT
Start: 2020-01-01

## 2020-03-22 NOTE — TELEPHONE ENCOUNTER
Message Return    3/22/2020  12:45 PM    Message returned by Mary Kate Newman MD    Patient: Kamron Duarte   Phone number-  [phone] (home)       Phone conversation with: Patient.     Situation/Background: Kamron Duarte  Is a 24 year old  male who is calling because he needs refill of Aristada, which is due 3/24. He has been living in [place] since July 2019, and was recently hospitalized in [place], [place]. At discharge, he was given one shot of aristada, but doesn't have any refills and is calling asking for one. He clarifies that he is currently in [place] (visiting family). He is technically still a [facility] patient.     Assessment: 24 year old male with psychiatric history (unclear diagnoses), on long-acting-injectable Aristada (one shot q2 months), who calls for refill.     Recommendation/Plan:   - Informed patient I am unable to refill medication over phone without further insight into psychiatric history/history of this prescription Aristada, and that we need further psychiatric history and medication history.   - Recommended patient call [facility] for in-person or virtual appointment on 3/23 or 3/24 for further psych history/med rec and to prescribe aristada if appropriate.     Mary Kate Newman MD

## 2020-04-14 PROBLEM — F20.9 SCHIZOPHRENIA (H): Status: ACTIVE | Noted: 2020-01-01

## 2020-04-14 NOTE — PROGRESS NOTES
"Family Medicine Telephone Visit Note           Telephone Visit Consent   Patient was verbally read the following and verbal consent was obtained.    \"This telephone visit will be conducted via a call between you and your physician/provider. We have found that certain health care needs can be provided without the need for a physical exam.  This service lets us provide the care you need with a short phone conversation.  If a prescription is necessary we can send it directly to your pharmacy.  If lab work is needed we can place an order for that and you can then stop by our lab to have the test done at a later time.    Telephone visits are billed at different rates depending on your insurance coverage. During this emergency period, for some insurers they may be billed the same as an in-person visit.  Please reach out to your insurance provider with any questions.    If during the course of the call the physician/provider feels a telephone visit is not appropriate, you will not be charged for this service.\"    Name person giving consent:  Patient   Date verbal consent given:  4/14/2020  Time verbal consent given:  1:15 PM      Chief Complaint   Patient presents with     Medication Request              HPI   Patients name: Kamron  Appointment start time:  1:15 PM    Was on aristada in [place]     Was hospitalized at [place]. On aristada for 2 months. Now back on Seroquel.     Denies depression, suicidal ideation, auditory or visual hallucinations, no lois or hypomania.     ROS positive for anxiety.       Current Outpatient Medications   Medication Sig Dispense Refill     hydrocortisone (CORTAID) 1 % external cream Apply topically 2 times daily as needed for rash or itching 20 g 1     hydrOXYzine (ATARAX) 50 MG tablet Take 0.5-1 tablets (25-50 mg) by mouth every 4 hours as needed for itching 90 tablet 3     melatonin 3 MG tablet Take 3 mg by mouth nightly as needed for sleep       QUEtiapine (SEROQUEL) 200 MG " "tablet Take 1 tablet (200 mg) by mouth 2 times daily 60 tablet 3     simethicone (MYLICON) 80 MG chewable tablet Take 80 mg by mouth every 6 hours as needed for flatulence or cramping       venlafaxine (EFFEXOR-XR) 150 MG 24 hr capsule Take 150 mg by mouth daily       Allergies   Allergen Reactions     Mushrooms [Mushroom]      Sulfa Drugs               Review of Systems:     A greater than 4 point review of symptoms was completed and negative other than noted in the HPI>          Physical Exam:     There were no vitals taken for this visit.  Estimated body mass index is 33.23 kg/m  as calculated from the following:    Height as of 5/17/19: 1.778 m (5' 10\").    Weight as of 6/4/19: 105.1 kg (231 lb 9.6 oz).    Exam:  Constitutional: alert and no distress  Psychiatric: mentation appears normal, affect normal/bright and judgment and insight intact          Assessment and Plan   1. Schizophrenia, unspecified type (H)  Patient with diagnosis of schizophrenia, hx of major depression, and recent hospitalization with starting of long acting antipsychotic. Now back on seroquel due to move. Asymptomatic. Requesting resumption of the long acting medication. Referred to psychiatry to discuss this. He will come into clinic to complete a release of information for the recent hospitalization.   - BEHAVIORAL HEALTH REFERRAL ([facility] interal and external)    Refilled medications that would be required in the next 3 months.     After Visit Information:  Patient declined AVS     No follow-ups on file.    Appointment end time: 1:26 PM  This is a telephone visit that took 11 minutes.      Clinician location:  Grenoraalli Johnson MD  I precepted today with [facility].            "

## 2020-04-14 NOTE — TELEPHONE ENCOUNTER
Psychiatry Consult Referral:  Please schedule this patient with Dr. Sanabria on a Thursday morning.  This will be for a video consult if possible, otherwise can do a telephone visit.  This is for a one time consult only, not for ongoing psychiatric care.  She will provide recommendations to the PCP for ongoing care.     Please let the patient know that this is an educational consult clinic.  For that reason, Dr. Sanabria and a resident will be seeing the patient together.  If the patient is not comfortable with that we can assist with making arrangements for a psychiatry consult at an outside clinic.    If you are unable to reach the patient after two phone calls, please send a letter and close this encounter.    Thank you!
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
## 2020-04-22 PROBLEM — I82.622 ACUTE DEEP VEIN THROMBOSIS (DVT) OF LEFT UPPER EXTREMITY, UNSPECIFIED VEIN (H): Status: ACTIVE | Noted: 2020-01-01

## 2020-04-22 PROBLEM — F20.2: Status: ACTIVE | Noted: 2020-01-01

## 2020-04-22 PROBLEM — F43.10 PTSD (POST-TRAUMATIC STRESS DISORDER): Status: ACTIVE | Noted: 2020-01-01

## 2020-04-22 NOTE — PROGRESS NOTES
Fantastic, thank you both for taking such good care of this pt.   This is very much appreciated!    Previous Messages     ----- Message -----   From: Rene Alves MD   Sent: 4/21/2020   4:24 PM CDT   To: Radha Long PsyD, Renée Wilks MD, *   Subject: RE: pt needs psychiatrist                         Poonam Miller. I didn't know you were working at [employer]. 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 This case looks right in my wheelhouse, so I hope to be able to keep following him for now.  I will probably try to switch back to po aripiprazole until we can figure out the logistics of getting him on Aristada during the pandemic.     Since it can take a while for records to get through scanning into EPIC and they're impossible to read efficiently there anyway, can you try to get them scanned to a .pdf and emailed to me at [email or web address]?     I already read your consult, angel Rome   ----- Message -----   From: Carissa Sanabria MD   Sent: 4/21/2020   3:42 PM CDT   To: Rene Alves MD, *   Subject: RE: pt needs psychiatrist                             Paul Rome,     This is wonderful news.  Thank you.  I will reach out to Kamron to let him know he will be able to establish with you as well as to anticipate a call from scheduling to set up a visit.       I will route my consult note to you.  His recent hospital records have been requested and should be here by then -- I'll sort out how to get them to you.     Poonam Smiley     ----- Message -----   From: Rene Alves MD   Sent: 4/21/2020   3:20 PM CDT   To: Radha Long PsyD, Renée Wilks MD, *   Subject: RE: pt needs psychiatrist                         Yes, I'm trying to get him put into my schedule for a video evaluation on Friday morning. Right now, I'm blocked out completely for ECT, so Scheduling may need to get IT involved because the ECT folks don't know how to get rid of  "my green space for ECT.     Wild   ----- Message -----   From: Renée Wilks MD   Sent: 2020   5:38 PM CDT   To: Rene Alves MD, *   Subject: pt needs psychiatrist                             Wild,   Can you please see this person sometime in the next 3-7 days?   He was seen for a one time consult at [facility] and needs to establish with us.   If you could see him to get him started on med, he can transfer to APRN or  if you want. Or you can continue with him, whatever you prefer. Here is the message from our [facility] consultant:     \"One of the consults I saw on Thursday will need on-going psychiatric care.  Radha has placed the referral though I want to loop you in so that you may use your santiago at hand to expedite the process :) (if possible.)   Here is brief recap:   The patient's name is Kamron Duarte  1995.     Hx of schizophrenia and malignant catatonia (remote substance use hx); recently hospitalized for 2 mos in [place]; stabilized on Aristada VILLASENOR (unknown dose).  He missed his injection (due 3/24/20 per his report) and put himself back on quetiapine until he could establish with a psychiatrist.     We don't have records.  Trying diligently to get them.  I'll reach back out when I have them though in the meantime would be great if he could be scheduled with someone.\"   -Poonam Sanabria     Thanks Wild, let us know!           "

## 2020-04-22 NOTE — TELEPHONE ENCOUNTER
Called patient and updated as to plans for him to follow with Dr Alves. He reported already receiving a call with possible appointment for Friday from psychiatry.

## 2020-04-24 NOTE — PROGRESS NOTES
"VIDEO VISIT  Kamron Duarte is a 24 year old patient who is being evaluated via a billable video visit.      The patient has been notified of following:   \"We have found that certain health care needs can be provided without the need for an in-person physical exam. This service lets us provide the care you need with a video conversation. If a prescription is necessary we can send it directly to your pharmacy. If lab work is needed we can place an order for that and you can then stop by our lab to have the test done at a later time. Video visits are billed at different rates depending on your insurance coverage. Please reach out to your insurance provider with any questions. If during the course of the call it appears that a video visit is not appropriate, you will not be charged for this service.\"    Patient has given verbal consent for video visit?:   Yes     How would you like to obtain your AVS?:  [employer]    AVS SmartPhrase [PsychAVS] has been placed in 'Patient Instructions':   Yes        Video- Visit Details  Type of service:  video visit for consult. Consultation provided at the request of provider Poonam Sanabria and Radha Long at [facility] for advice regarding the diagnosis and treatment of patient's Psychiatric condition. The patient's condition can be safely assessed via telemedicine.  Time of service:    Date:  04/24/2020    Video Start Time:  11:20 AM        Video End Time: 12:30 PM    Reason for video visit:  Patient unable to travel due to Covid-19  Originating Site (patient location):  Patient's home  Distant Site (provider location):  Remote location  Mode of Communication:  Video Conference via Doxy.me, converted to UC[employer] due to poor Internet connection      Psychiatry Clinic New Patient Medication Evaluation                                           Kamron Duarte is a 24 year old male who prefers the name Kamron and pronoun he, him, his.  Therapist: None  PCP: Michael Johnson  Other " "Providers: None  Referred by [facility] clinic for evaluation of possible psychosis  , possible PTSD  and previous diagnosis of schizophrenia or schizoaffective disorder, need managment recs.     History was provided by patient who was a good historian.     Chief Complaint                                                                                                        \" I need to get a psychiatrist in [place]\"     History of Present Illness                                                                                4, 4      Pertinent Background:  This patient has been diagnosed with catatonic schizophrenia, major depression, substance use disorder and PTSD.  Psych critical item history includes psychosis [sxs include Catatonic stupor necessitating commitment and court order for forced medication], psych hosp (<3), commitment, SUBSTANCE USE: cannabis and heroin and substance use treatment .     Most recent history began in December 2019 when he was living in [place] with his mother after moving back there earlier in the year.  There was a previous hospitalization for several months in early 2019 and he was discharged on Seroquel but by late fall he was taking it very erratically.  In retrospect he thinks that his symptoms were building up for a couple of months but his mother reported that she had only noticed changes for a couple of weeks preceding his admission to [facility] in [place] on January 10, where he was brought on a mental health hold.  The admission H&P was sketchy and largely based on reports from his mother as the patient was mute, stuporous, and reported not to have spoken or eaten in a week.  His mother said that he had not taken any medications since he left [place], and had a history of opiate, cannabis, and methamphetamine use.  She described his previous meth use as \"heavy\" but it was thought he had not been using drugs other than cannabis leading up to the " hospitalization since he was so incapacitated.  She reported a previous history of such behavior which responded to lorazepam, but the patient consistently refused for several weeks until finally he was committed with a court order for forced medication.  At that point around January 24 he received an injection of Aristada 1064 milligrams.  He began to improve in mid February, and was referred to a group home prior to his discharge on March 4, but apparently he never went there.  He returned to [place], where he resumed using some Seroquel he had in the past and is currently taking 400 mg a day.  Was prescribed 200 mg twice daily, but he found it too sedating during the day so switch it to bedtime, which I advised him was perfectly acceptable.  He does not like the Seroquel because it has caused weight gain in the past, but he does note that he was extremely nervous and restless after he got the aripiprazole VILLASENOR, and he is noticed that getting better in the last month.  He was due for his second injection on March 24, when he called [facility] to get a refill, he was seen on April 16 for video visit psychiatric evaluation by Dr. Alvarez.  Her history showed that he did admit having increasing paranoid thoughts as well as worsening depression prior to his catatonia and hospitalization, and also he minimizes his prior substance abuse history.  She continued his Seroquel and made arrangements for me to evaluate him through the psychiatry clinic.    Recent Symptoms:   Depression:  depressed mood and poor concentration /memory  Psychosis:  Denies any current paranoid thinking, hallucinations, feelings of being  frozen or slowed down or any other indication of catatonia  ADVERSE EFFECTS: Akathisia with Aristada, this is improving but patient worries about weight gain from Seroquel       Recent Substance Use:  The patient denies using any substances since December, although he notes he is living at the [facility] the [facility] and there are users there      Substance Use History     Past Use- Opioids- yes, Used heroin between 2013 and 2017.    Narcan Kit- N/A , Cannabis- yes, Regular use since age 14, last used 5 months ago  and Other Illicit Drugs-cocaine, methamphetamine, hallucinogens, shrooms and acid experimentally but does not think he ever had an addiction to any of them  Treatment [#, most recent]-[facility] in part for substance use and previously had been treated with Suboxone  Medical Consequences [withdrawal, sz etc]- SIB- None   Legal Consequences- SIB-[facility] notes extensive past legal problems related to substance use     Psychiatric History     Suicidal ideation-reports he has had thoughts of suicide off and on over many years, sometimes in association with substance use or intoxication but other times due to depression  Suicide Attempt:   #-1   most Recent-2012 made a suicide attempt after breaking up with his girlfriend  Psychosis-from the ages of [age] used heroin and stimulants extensively and reports frequent paranoid thinking during that time, but never sought psychiatric care and never treated.  In care home in August 2018 unclear whether he went from there to [facility] directly or was hospitalized in between and then transferred to [facility] in late 2018  Psych Hosp-briefly in 2012, late 2018 (possible); [facility] 4 months in early 2019; [place] in [place] for 2 months in January and February 2020       Psychiatric Medication Trials     Paxil (paroxetine) and Effexor (venlafaxine)  Seroquel (quetiapine) and Abilify (aripriprazole)  Trazodone for sleep     Social/ Family History               [per patient report]                                                 1ea, 1ea     FINANCIAL SUPPORT- working, social security disability and Just got a part-time job working for a [employer] company which will start next week       CHILDREN- None       LIVING SITUATION-currently  sharing a room at the [facility] with his father, reports he has lived there before and his father was staying there when he moved back from [place]      LEGAL-did some long-term time in 2018, possibly more earlier related to drugs, note from [facility] psychiat[facility] clinic in 2019 references an old DUI which was pending, current status unknown  EARLY HISTORY/ EDUCATION-Father reportedly 13 years old and mother a few years older when he was born; reports he lived in 13 states during his childhood because his dad would move around looking for [employer] welding jobs, moved to [place] at age 12, dropped out of high school in the 11th grade  TRAUMA HISTORY (self-report)-previous chart references chaotic childhood and he reports both parents drank heavily  FEELS SAFE AT HOME- Yes  FAMILY HISTORY-records from [place] indicate family history of bipolar in the patient's mother although he was not aware of that, was not keen on me contacting her but rather preferred his father be the main contact     Medical / Surgical History     Patient Active Problem List   Diagnosis     Major depressive disorder, recurrent episode, in full remission (H)     Hepatitis C     Substance use disorder     Schizophrenia (H)     Schizophrenia, catatonic, in remission (H)     PTSD (post-traumatic stress disorder)     Acute deep vein thrombosis (DVT) of left upper extremity, unspecified vein (H)       Past Surgical History:   Procedure Laterality Date     TONSILLECTOMY           Medical Review of Systems                                                                                                    2, 10     A comprehensive review of systems was performed and is negative other than noted in the HPI.     Allergy   Mushrooms [mushroom] and Sulfa drugs   Current Medications     Current Outpatient Medications   Medication Sig Dispense Refill     hydrocortisone (CORTAID) 1 % external cream Apply topically 2 times daily as needed for rash or itching 20 g  1     hydrOXYzine (ATARAX) 50 MG tablet Take 0.5-1 tablets (25-50 mg) by mouth every 4 hours as needed for itching 90 tablet 3     melatonin 3 MG tablet Take 3 mg by mouth nightly as needed for sleep       QUEtiapine (SEROQUEL) 200 MG tablet Take 1 tablet (200 mg) by mouth 2 times daily 60 tablet 3     simethicone (MYLICON) 80 MG chewable tablet Take 80 mg by mouth every 6 hours as needed for flatulence or cramping       venlafaxine (EFFEXOR-XR) 150 MG 24 hr capsule Take 150 mg by mouth daily        Vitals                                                                                                                        3, 3     There were no vitals taken for this visit.      Mental Status Exam                                                                                   9, 14 cog gs     Alertness: alert  and oriented  Appearance: adequately groomed  Behavior/Demeanor: cooperative and pleasant, with good  eye contact   Speech: normal  Language: intact  Psychomotor: normal or unremarkable  Mood: description consistent with euthymia  Affect: full range; was congruent to mood; was congruent to content  Thought Process/Associations: Coherent and goal oriented, with no evidence of speech hesitancy, alogia or thought disorder  Thought Content:  Reports none;  Denies suicidal ideation, delusions and paranoid ideation  Perception:  Reports none;  Denies auditory hallucinations and visual hallucinations  Insight: adequate  Judgment: fair  Cognition: (6) oriented: time, person, and place  attention span: intact  concentration: intact  remote memory: limited  fund of knowledge: low-normal  Gait and Station: unremarkable     Labs and Data     Rating Scales:    N/A    PHQ9 Today:  n/a  No flowsheet data found.      Recent Labs   Lab Test 04/29/19  1506   CR 0.9   GFRESTIMATED >90     Recent Labs   Lab Test 04/29/19  1506   AST 16.4   ALT 23.2   ALKPHOS 90.5       Diagnosis and Assessment                                                                              m2, h3     Today the following issues were addressed:    1) major depression severe with catatonic features, now resolved and euthymic; rule out bipolar disorder with catatonic episode; doubt schizophrenia due to seemingly intact personality and cognition with no current evidence of positive symptoms or thought disorder  2) polysubstance abuse, primarily opiates but also cannabis and stimulants.  Patient denies current use, although at times attempted when she is others using in his residence  3) possible PTSD related to chaotic childhood.  We did not address this issue today but may explore later    [place] Prescription Monitoring Program [] review was not needed today.    PSYCHOTROPIC DRUG INTERACTIONS: none clinically relevant    Plan                                                                                                                     m2, h3     1) the patient reports he did extremely well on aripiprazole even without a concurrent antidepressant or mood stabilizer.  However he was troubled by restlessness which I suspect was due to the fact that he got a 2-month long-acting injectable dose without any oral running with aripiprazole because he refused everything up to the court order.  Quetiapine has more antidepressant efficacy but he does not like the weight gain he had previously with it.  It should be noted however that he had an extensive use of stimulants and likely he was given quetiapine after he became abstinent so he may have gained weight solely because of that.  Medications-   Start aripiprazole 5 mg every morning.  We will likely titrate that up higher with a target of 15 mg daily prior to converting to an VILLASENOR  May decrease Seroquel to 300 mg at bedtime  Consider addition of propranolol to treat akathisia  Also consider likelihood bipolar disorder, and eventual crossover to a mood stabilizer rather than long-term antipsychotics    2) we will explore  the need for psychotherapy or additional community support services in the future      3) RTC: follow-up in 2 to 3 weeks        CRISIS NUMBERS:   Provided routinely in AVS.    Treatment Risk Statement:  The patient understands the risks, benefits, adverse effects and alternatives. Agrees to treatment with the capacity to do so. No medical contraindications to treatment. Agrees to call clinic for any problems. The patient understands to call 911 or go to the nearest ED if life threatening or urgent symptoms occur.        PROVIDER:  Rene Alves MD  [facility] Department of Psychiatry and Behavioral Sciences

## 2020-04-24 NOTE — TELEPHONE ENCOUNTER
On 4/24/2020, at 10:53, writer called patient at [phone] to confirm Virtual Visit. Writer unable to make contact with patient. Writer left detailed voice message with the doxy info for the 11 am appt. Writer left call back number if he has questions.  Lucero Marrero, CMA

## 2020-04-25 PROBLEM — F20.9 SCHIZOPHRENIA (H): Status: RESOLVED | Noted: 2020-01-01 | Resolved: 2020-01-01

## 2020-04-25 PROBLEM — F33.3: Status: ACTIVE | Noted: 2020-01-01

## 2020-04-25 PROBLEM — F06.1: Status: ACTIVE | Noted: 2020-01-01

## 2020-04-25 PROBLEM — F20.2: Status: RESOLVED | Noted: 2020-01-01 | Resolved: 2020-01-01

## 2020-04-27 NOTE — PATIENT INSTRUCTIONS
Treatment Plan Today:   1. Start taking aripiprazole (Abilify) 5 mg every morning.  If you find that it makes you too sleepy during the day you can take it at bedtime.  2. Decrease Seroquel to 300 mg (1-1/2 tablets) at bedtime, but if you have trouble sleeping you can take 100 mg extra  3. Call the psychiatry clinic if you notice trouble some restlessness or other side effects which are concerning to you.        ------------------------------------------------------------------------    Thank you for coming to the PSYCHIATRY CLINIC.    Lab Testing:  If you had lab testing today and your results are reassuring or normal they will be mailed to you or sent through MyRefers within 7 days. If the lab tests need quick action we will call you with the results. The phone number we will call with results is # [phone] (home) . If this is not the best number please call our clinic and change the number.    Medication Refills:  If you need any refills please call your pharmacy and they will contact us. Our fax number for refills is [phone]. Please allow three business for refill processing. If you need to  your refill at a new pharmacy, please contact the new pharmacy directly. The new pharmacy will help you get your medications transferred.     Scheduling:  If you have any concerns about today's visit or wish to schedule another appointment please call our office during normal business hours [phone] (8-5:00 M-F)    Contact Us:  Please call [phone] during business hours (8-5:00 M-F).  If after clinic hours, or on the weekend, please call  [phone].    Financial Assistance [phone]  [facility] [phone]  Central Billing Office, ealth: [phone]  [facility]illing [phone]  Medical Records [phone]    MENTAL HEALTH CRISIS NUMBERS:  [facility]:   [facility] -[phone]   Crisis Residence [facility] -[phone]   Walk-In Counseling  Center [facility] -[phone]   COPE 24/7 [facility] Mobile Team -[phone] (adults)/[phone] (child)     [facility]:   [facility] - [phone]   Walk-in counseling [facility] - [phone]   Walk-in counseling [facility] - [phone]   Crisis Residence [facility] - [phone]   Urgent Care Adult Mental Tgfpsh-[phone] mobile unit/ 24/7 crisis line    Other Crisis Numbers:   National Suicide Prevention Lifeline: 606-400-BNRX (352-188-4343)  CRISIS TEXT LINE: Text  to:062562  for any crisis 24/7;    OR   - see www.crisistextline.org   Poison Control Center - 1-408.963.5084  CHILD: [facility] needs assessment team - [phone]   [facility] Lifeline - 1-668.205.5148; [facility] [facility] Lifeline - 1-180.427.5724    For a medical emergency please call  911 or go to the nearest ER.     ---------------------------------------------------------------    Again thank you for choosing PSYCHIATRY CLINIC and please let us know how we can best partner with you to improve you and your family's health.    You may be receiving a survey regarding this appointment. We would love to have your feedback, both positive and negative. The survey is done by an external company, so your answers are anonymous.

## 2021-02-23 NOTE — TELEPHONE ENCOUNTER
Called [facility] with goal of obtaining Kamron's medication doses.  SOLO required.  Spoke with medical records who provided me information for online SOLO.     Called Kamron, walked him through how to fill out the online form (he had it pulled up online during call), provided fax number for [facility].  Informed him that medical records felt they could send out information within 24 hours from receipt of SOLO.  I would review, discuss with psychiatric clinic pharmacy (to determine need for taper, etc) and send in rx after receiving records.     Carissa Sanabria MD  Child & Adolescent Psychiatry      [place]: 5
